# Patient Record
Sex: FEMALE | Race: BLACK OR AFRICAN AMERICAN | NOT HISPANIC OR LATINO | Employment: FULL TIME | URBAN - METROPOLITAN AREA
[De-identification: names, ages, dates, MRNs, and addresses within clinical notes are randomized per-mention and may not be internally consistent; named-entity substitution may affect disease eponyms.]

---

## 2017-09-29 ENCOUNTER — APPOINTMENT (OUTPATIENT)
Dept: WOMENS IMAGING | Facility: HOSPITAL | Age: 42
End: 2017-09-29

## 2017-09-29 PROCEDURE — 77063 BREAST TOMOSYNTHESIS BI: CPT | Performed by: RADIOLOGY

## 2017-09-29 PROCEDURE — 77067 SCR MAMMO BI INCL CAD: CPT | Performed by: RADIOLOGY

## 2020-05-26 ENCOUNTER — APPOINTMENT (OUTPATIENT)
Dept: WOMENS IMAGING | Facility: HOSPITAL | Age: 45
End: 2020-05-26

## 2020-05-26 PROCEDURE — 77067 SCR MAMMO BI INCL CAD: CPT | Performed by: RADIOLOGY

## 2020-05-26 PROCEDURE — 77063 BREAST TOMOSYNTHESIS BI: CPT | Performed by: RADIOLOGY

## 2021-04-06 ENCOUNTER — BULK ORDERING (OUTPATIENT)
Dept: CASE MANAGEMENT | Facility: OTHER | Age: 46
End: 2021-04-06

## 2021-04-06 DIAGNOSIS — Z23 IMMUNIZATION DUE: ICD-10-CM

## 2021-06-01 ENCOUNTER — APPOINTMENT (OUTPATIENT)
Dept: WOMENS IMAGING | Facility: HOSPITAL | Age: 46
End: 2021-06-01

## 2021-06-01 PROCEDURE — 77067 SCR MAMMO BI INCL CAD: CPT | Performed by: RADIOLOGY

## 2021-06-01 PROCEDURE — 77063 BREAST TOMOSYNTHESIS BI: CPT | Performed by: RADIOLOGY

## 2022-06-20 ENCOUNTER — APPOINTMENT (OUTPATIENT)
Dept: WOMENS IMAGING | Facility: HOSPITAL | Age: 47
End: 2022-06-20

## 2022-06-20 PROCEDURE — 76641 ULTRASOUND BREAST COMPLETE: CPT | Performed by: RADIOLOGY

## 2022-06-20 PROCEDURE — 77062 BREAST TOMOSYNTHESIS BI: CPT | Performed by: RADIOLOGY

## 2022-06-20 PROCEDURE — G0279 TOMOSYNTHESIS, MAMMO: HCPCS | Performed by: RADIOLOGY

## 2022-06-20 PROCEDURE — 77066 DX MAMMO INCL CAD BI: CPT | Performed by: RADIOLOGY

## 2022-06-24 ENCOUNTER — TRANSCRIBE ORDERS (OUTPATIENT)
Dept: ADMINISTRATIVE | Facility: HOSPITAL | Age: 47
End: 2022-06-24

## 2022-06-24 DIAGNOSIS — N64.52 DISCHARGE FROM RIGHT NIPPLE: Primary | ICD-10-CM

## 2022-07-18 ENCOUNTER — APPOINTMENT (OUTPATIENT)
Dept: MRI IMAGING | Facility: HOSPITAL | Age: 47
End: 2022-07-18

## 2022-10-18 NOTE — PROGRESS NOTES
[facility]     Referring Provider: Madelin King MD     Chief complaint: nipple discharge     HPI: Ms. Arben Marcelo is a 48 yo woman, seen at the request of Madelin King MD, for right nipple discharge    I personally reviewed her records and summarized her relevant breast history/imagin2020 bilateral screening mammo with Josafat at [facility]  There are scattered areas of fibroglandular density.  There are stable asymmetries and areas of architectural distortion consistent with prior reduction mammoplasty's.  Scattered benign round, rim and skin calcifications are present.  There are no suspicious masses, calcifications, or areas of architectural distortion.  Impression  Finding both breast are benign negative.  BI-RADS 2    2021 bilateral screening mammogram with Josafat at [facility]  There are scattered areas of fibroglandular density.  There are no significant changes from the prior exam.  There are stable asymmetries and areas of architectural distortion consistent with prior reduction mammoplasty's.  Scattered benign round, rim and skin calcifications are present.  There are no suspicious masses, calcifications, or areas of architectural distortion.  Impression  Findings in both breast are benign negative.  BI-RADS 2    2022 bilateral diagnostic mammogram with Josafat and bilateral complete breast ultrasound at [facility]  There are scattered areas of fibroglandular density.  Finding 1 the patient indicates pain in the 130 o'clock region of the left breast.  The symptomatic region is clearly marked and there is no suspicious findings in the region of clinical concern.  No masses, microcalcifications or significant architectural distortions are identified.  Finding 2 the patient indicates nonbloody nipple discharge in the right breast.  There is no suspicious findings in the region of clinical concern.  Finding 1 there is no evidence of any solid mass or abnormal cystic elements.  Finding 2 there is  "no evidence of any solid mass, ductal dilation or abnormal cystic elements.  Impression  Finding 1 area in the left breast is benign negative.  Finding 2 area in the right breast is benign negative.  The patient complains of nipple discharge.  The discharge is found to be persistent or from a single duct and further evaluation by ductogram or breast MRI is recommended.    9/26/2022   TSH- 1.04  Prolactin 32.8    9/26/2022 Saw Farrah Reed ANITA  \" States last nipple pain or discharge intermittent, comes and goes around the month or with intercourse.  States symptoms the past 8 years\"    BI-RADS 2  She has a personal history of breast reduction in 2000, right ovary and fallopian tube removed, ypothyroidism      She denies any family history of breast or ovarian cancer.     Today she presents with concerns regarding right nipple discharge. Present for 8 years with stimulation only. Multi ductal  Yellow to clear discharge. Never sees on clothes on on bra.    Also had a few weeks of left breat outer quadrant pain in June. Dx mammo normal.   She denies any breast lumps, pain, skin changes.  She denies any prior history of abnormal mammograms or breast biopsies.     She was by herself in clinic today.     Review of Systems   Constitutional: Positive for diaphoresis and fatigue.   Eyes: Positive for eye problems.        Blurry vision   Endocrine: Positive for hot flashes.   Genitourinary: Positive for hematuria.    Neurological: Positive for dizziness.   Psychiatric/Behavioral: Positive for decreased concentration.   All other systems reviewed and are negative.      Medications:    Current Outpatient Medications:   •  acetaminophen (TYLENOL) 500 MG tablet, Take 500 mg by mouth Every 6 (Six) Hours As Needed for mild pain (1-3)., Disp: , Rfl:   •  DULoxetine (CYMBALTA) 30 MG capsule, Take 1 capsule by mouth Daily., Disp: , Rfl:   •  Ergocalciferol (VITAMIN D2 PO), Take 50,000 Units by mouth 1 (One) Time Per Week. ON MONDAYS, " Disp: , Rfl:   •  levothyroxine (SYNTHROID, LEVOTHROID) 125 MCG tablet, Take 125 mcg by mouth Every Morning., Disp: , Rfl:   •  meloxicam (MOBIC) 15 MG tablet, Take 1 tablet by mouth Daily., Disp: , Rfl:   •  multivitamin with minerals (Multi Vitamin/Minerals) tablet tablet, Take  by mouth., Disp: , Rfl:   •  VITAMIN D PO, Take  by mouth., Disp: , Rfl:     Allergies:  Allergies   Allergen Reactions   • Oxycodone-Acetaminophen Unknown - Low Severity       Medical history:  Past Medical History:   Diagnosis Date   • Disease of thyroid gland    • Ovarian cyst     RIGHT SIDE WITH PAIN       Surgical History:  Past Surgical History:   Procedure Laterality Date   • BILATERAL BREAST REDUCTION     •  SECTION      X 1   • DIAGNOSTIC LAPAROSCOPY N/A 2016    Procedure:  LAPAROSCOPY RT OVARIAN CYSTECTOMY, RT SALPINGECTOMY;  Surgeon: Madelin King MD;  Location[place] [facility] [place];  Service:        Family History:  Family History   Problem Relation Age of Onset   • Colon cancer Mother 67   • Colon cancer Sister 54       Social History:   Social History     Socioeconomic History   • Marital status:    Tobacco Use   • Smoking status: Never   • Smokeless tobacco: Never   Substance and Sexual Activity   • Alcohol use: No   • Drug use: No     Patient drinks 2 servings of caffeine per day.       GYNECOLOGIC HISTORY:   G: 3. P: 3. AB: 0.  Last menstrual period: 2022  Age at menarche: 10  Age at first childbirth: 24  Lactation/How lon week  Age at menopause: na  Total years of oral contraceptive use: 20  Total years of hormone replacement therapy: na      Physical Exam  Vitals:    10/20/22 1235   BP: 122/78   Pulse: 82   SpO2: 100%     ECOG 0 - Asymptomatic  General: NAD, well appearing  Psych: a&o x 3, normal mood and affect  Eyes: EOMI, no scleral icterus  ENMT: neck supple without masses or thyromegaly, mucus membranes moist  Resp: normal effort, CTAB  CV: RRR, no murmurs, no edema   GI: soft, NT,  ND  MSK: normal gait, normal ROM in bilateral shoulders  Lymph nodes:  no cervical, supraclavicular or axillary lymphadenopathy  Breast: symmetric, medium  Right:  No visible abnormalities on inspection while seated, with arms raised or hands on hips. No masses, skin changes, or nipple abnormalities.  Left:  No visible abnormalities on inspection while seated, with arms raised or hands on hips. No masses, skin changes, or nipple abnormalities.      Assessment:    1)  47 y.o. F with a new diagnosis of right nipple discharge  2) breast pain    Discussion:  1. We discussed types of nipple discharge. We discussed that physiologic discharge is usually bilateral, nonspontaneous, multi-ductal, and milky, but can be yellow, green or brown. That this can sometimes be associated with elevated prolactin levels and that elevated prolactin levels can have multiple causes (pregnancy, pituitary, thyroid, medications, etc).    We discussed that pathologic nipple discharge usually is unilateral, spontaneous, persistent, comes from a single duct, is bloody, clear, or serosanguinous, or is associated with a palpable or radiological evident breast mass.     I do believe that hers is physiologic in nature and an MRI is not warranted at this time.     2. We had a lengthy discussion about breast pain and how it can sometimes be difficult to treat. We discussed that this is often related to hormonal changes. Caffeine and nicotine can also play a role in breast pain. We also discussed the importance of wearing a good supportive bra at all times including bedtime.  We discussed additional therapies such as vitamin E supplementation and Primrose oil, and that these may or may not be useful. We discussed using OTC tylenol or ibuprofen for pain control. Finally, we discussed the use of topical NSAID creams.      Plan:  1. Wear sports bras during the day when possible. Wear sports bra or tight camisole at night while sleeping.   2. Take Vitamin  E, 200 U, twice daily or evening primrose oil once or twice daily   3. Continue to reduce caffeine intake.   4. May use OTC tylenol or ibuprofen, or topical products, for pain control.   5. F/u in 3 months. Call sooner with any questions, concerns     Interventions for breast discomfort reduction were given and reviewed.  She will return to clinic in 3 months for evaluation of their effectiveness with exam only.      I have advised the patient to continue monthly breast self examination and to return to see me in months after completion of imaging.  She was also advised to notify us if she develops new breast symptoms.       ANITA Carroll    I have spent 50 min in face to face time with the patient and chart review.    CC:  MD Magalie Gill Christin E, MD EMR Dragon/transcription disclaimer:  Dictated using Dragon dictation

## 2022-10-20 ENCOUNTER — OFFICE VISIT (OUTPATIENT)
Dept: SURGERY | Facility: CLINIC | Age: 47
End: 2022-10-20

## 2022-10-20 VITALS
WEIGHT: 160 LBS | BODY MASS INDEX: 25.11 KG/M2 | DIASTOLIC BLOOD PRESSURE: 78 MMHG | HEIGHT: 67 IN | OXYGEN SATURATION: 100 % | SYSTOLIC BLOOD PRESSURE: 122 MMHG | HEART RATE: 82 BPM

## 2022-10-20 DIAGNOSIS — N64.52 NIPPLE DISCHARGE: Primary | ICD-10-CM

## 2022-10-20 DIAGNOSIS — N64.4 BREAST PAIN: ICD-10-CM

## 2022-10-20 PROCEDURE — 99204 OFFICE O/P NEW MOD 45 MIN: CPT | Performed by: NURSE PRACTITIONER

## 2022-10-20 RX ORDER — MELOXICAM 15 MG/1
15 TABLET ORAL DAILY
COMMUNITY

## 2022-10-20 RX ORDER — MULTIPLE VITAMINS W/ MINERALS TAB 9MG-400MCG
TAB ORAL
COMMUNITY

## 2022-10-20 RX ORDER — DULOXETIN HYDROCHLORIDE 30 MG/1
30 CAPSULE, DELAYED RELEASE ORAL DAILY
COMMUNITY

## 2023-01-19 ENCOUNTER — TELEPHONE (OUTPATIENT)
Dept: SURGERY | Facility: CLINIC | Age: 48
End: 2023-01-19
Payer: COMMERCIAL

## 2023-01-19 NOTE — TELEPHONE ENCOUNTER
Spoke to pt regarding r/s her cancelled appt with Marcell Duncan. She stated she is fine now and does not wish to r/s at this time.